# Patient Record
Sex: MALE | Race: ASIAN | NOT HISPANIC OR LATINO | URBAN - METROPOLITAN AREA
[De-identification: names, ages, dates, MRNs, and addresses within clinical notes are randomized per-mention and may not be internally consistent; named-entity substitution may affect disease eponyms.]

---

## 2023-06-13 NOTE — PROGRESS NOTES
Subjective:       Christie Alcazar is a 53 y.o. male who is a new patient who was self-referred  for evaluation of prostate issues.      He reports LUTS. No prior records in [facility] He reports he previously saw Dr Guzmán, last seen 6-9 months ago.     He reports pain in prostate (though on further questioning he states this is testicular pain) and occasional urgency. Some days he notes more frequency. He is on Flomax (takes PRN) and Pyridium PRN. States he was recently given abx by PCP.    Reports prostatitis a few years ago - treated with 3-4 rounds of abx per his report. Had an ultrasound (unsure what kind of US) and cystoscopy in the past with Dr Guzmán. States cysto was normal.     He states he is UTD on PCa screening.      The following portions of the patient's history were reviewed and updated as appropriate: allergies, current medications, past family history, past medical history, past social history, past surgical history and problem list.    Review of Systems  Twelve point review of systems completed. Pertinent positive and negatives listed in HPI.      Objective:    Vitals: Wt 84.6 kg (186 lb 9.9 oz)     Physical Exam   General: well developed, well nourished in no acute distress  Head: normocephalic, atraumatic  Neck: supple, trachea midline, no obvious enlargement of thyroid  HEENT: EOMI, mucus membranes moist, sclera anicteric, no hearing impairment  Lungs: symmetric expansion, non-labored breathing  Skin: no rashes or lesions  Neuro: alert and oriented x 3, no gross deficits  Psych: normal judgment and insight, normal mood/affect and non-anxious  Genitourinary: deferred   GREER: deferred      Lab Review   Urine analysis today in clinic shows Negative     No results found for: WBC, HGB, HCT, MCV, PLT  No results found for: CREATININE, BUN  No results found for: PSA  No results found for: PSADIAG    Imaging  NA       Assessment/Plan:      1. Pain in both testicles    - Intermittent   -  Exacerbated by certain diet triggers   - Flomax daily   - Ibuprofen     2. Urinary urgency    - UA normal   - Prior cystoscopy with Dr Guzmán     3. Other prostatic inflammatory diseases    - Prior h/o prostatitis      4. Enlarged prostate    - On Flomax     5. ED   - Using Viagra PRN    Recommend to get records from Dr Guzmán    Follow up in 6 months with PSA

## 2023-06-19 ENCOUNTER — OFFICE VISIT (OUTPATIENT)
Dept: UROLOGY | Facility: CLINIC | Age: 53
End: 2023-06-19
Payer: COMMERCIAL

## 2023-06-19 ENCOUNTER — TELEPHONE (OUTPATIENT)
Dept: UROLOGY | Facility: CLINIC | Age: 53
End: 2023-06-19

## 2023-06-19 VITALS — WEIGHT: 186.63 LBS

## 2023-06-19 DIAGNOSIS — R39.15 URINARY URGENCY: ICD-10-CM

## 2023-06-19 DIAGNOSIS — N41.8 OTHER PROSTATIC INFLAMMATORY DISEASES: ICD-10-CM

## 2023-06-19 DIAGNOSIS — N50.812 PAIN IN BOTH TESTICLES: Primary | ICD-10-CM

## 2023-06-19 DIAGNOSIS — N40.1 BPH WITH OBSTRUCTION/LOWER URINARY TRACT SYMPTOMS: Primary | ICD-10-CM

## 2023-06-19 DIAGNOSIS — N50.811 PAIN IN BOTH TESTICLES: Primary | ICD-10-CM

## 2023-06-19 DIAGNOSIS — N13.8 BPH WITH OBSTRUCTION/LOWER URINARY TRACT SYMPTOMS: Primary | ICD-10-CM

## 2023-06-19 DIAGNOSIS — N40.0 ENLARGED PROSTATE: ICD-10-CM

## 2023-06-19 PROCEDURE — 99204 OFFICE O/P NEW MOD 45 MIN: CPT | Mod: S$GLB,,, | Performed by: UROLOGY

## 2023-06-19 PROCEDURE — 99204 PR OFFICE/OUTPT VISIT, NEW, LEVL IV, 45-59 MIN: ICD-10-PCS | Mod: S$GLB,,, | Performed by: UROLOGY

## 2023-06-19 PROCEDURE — 99999 PR PBB SHADOW E&M-NEW PATIENT-LVL III: CPT | Mod: PBBFAC,,, | Performed by: UROLOGY

## 2023-06-19 PROCEDURE — 99999 PR PBB SHADOW E&M-NEW PATIENT-LVL III: ICD-10-PCS | Mod: PBBFAC,,, | Performed by: UROLOGY

## 2023-06-19 RX ORDER — PHENAZOPYRIDINE HYDROCHLORIDE 200 MG/1
200 TABLET, FILM COATED ORAL 3 TIMES DAILY
COMMUNITY
Start: 2023-06-08

## 2023-06-19 RX ORDER — TAMSULOSIN HYDROCHLORIDE 0.4 MG/1
2 CAPSULE ORAL
COMMUNITY
Start: 2023-06-08 | End: 2023-12-19 | Stop reason: SDUPTHER

## 2023-06-19 NOTE — PROGRESS NOTES
Pt requests PSA now and not in the 6 months as planned. Requests [facility] orders. I will not be alerted when results return.

## 2023-06-19 NOTE — TELEPHONE ENCOUNTER
Prior to pt leaving clinic, pt requested to completed lab within the next 2 weeks at [facility]. Contacted pt to confirm [facility] location pt is requesting to complete labs. Lab order faxed to [facility] at [address] in [place], [place].

## 2023-09-20 ENCOUNTER — TELEPHONE (OUTPATIENT)
Dept: UROLOGY | Facility: CLINIC | Age: 53
End: 2023-09-20
Payer: COMMERCIAL

## 2023-09-20 NOTE — TELEPHONE ENCOUNTER
----- Message from Virginia Nielson MA sent at 9/20/2023  3:41 PM CDT -----  Regarding: FW: self [phone]  Please send print another order for this pt to go to [facility], he missed his appt. I will fax it over to [facility].  ----- Message -----  From: Maria M Justice  Sent: 9/20/2023   2:20 PM CDT  To: [facility] Staff  Subject: self [phone]                                .Type: Patient Call Back    Who called: self    What is the request in detail: patient is requesting a call a back in regards to sending over another order to [facility] for blood work. Patient stated he missed his appointment.    Can the clinic reply by [facility]? no    Would the patient rather a call back or a response via [facility]? Call back    Best call back number [phone]

## 2023-09-20 NOTE — TELEPHONE ENCOUNTER
Pt was contacted, pt appt scheduled.   ----- Message from Maria M Justice sent at 9/20/2023  2:18 PM CDT -----  Regarding: self [phone]  .Type: Patient Call Back    Who called: self    What is the request in detail: patient is requesting a call a back in regards to sending over another order to [facility] for blood work. Patient stated he missed his appointment.    Can the clinic reply by [facility]? no    Would the patient rather a call back or a response via [facility]? Call back    Best call back number [phone]

## 2023-09-21 ENCOUNTER — TELEPHONE (OUTPATIENT)
Dept: UROLOGY | Facility: CLINIC | Age: 53
End: 2023-09-21
Payer: COMMERCIAL

## 2023-09-21 DIAGNOSIS — N13.8 BPH WITH OBSTRUCTION/LOWER URINARY TRACT SYMPTOMS: Primary | ICD-10-CM

## 2023-09-21 DIAGNOSIS — N40.1 BPH WITH OBSTRUCTION/LOWER URINARY TRACT SYMPTOMS: Primary | ICD-10-CM

## 2023-09-27 NOTE — PROGRESS NOTES
Subjective:       Christie Alcazar is a 53 y.o. male who is an established patient who was self-referred  for evaluation of prostate issues.      He reports LUTS. [facility] prior records [facility] He reports he previously saw Dr Guzmán, last seen 6-9 months ago.     He reports pain in prostate (though on further questioning he states this is testicular pain) and occasional urgency. Some days he notes more frequency. He is on Flomax (takes PRN) and Pyridium PRN. States he was recently given abx by PCP.    Reports prostatitis a few years ago - treated with 3-4 rounds of abx per his report. Had an ultrasound (unsure what kind of US) and cystoscopy in the past with Dr Guzmán. States cysto was normal.     He states he is UTD on PCa screening.    10/2/2023  Reported previous prostate pain. He wanted PSA done sooner, therefore it was ordered. I do not have these results as it was done by PCP (outside [facility]). He states he was given doxycycline previously that was helpful. He took 'leftover' abx recently on and off. Then took Cipro x 3d from PCP. He feels pulling in scrotum. Occasional pain with ejaculation. Denies dysuria. Occasional slow stream. Taking Flomax now BID - increased last week.       The following portions of the patient's history were reviewed and updated as appropriate: allergies, current medications, past family history, past medical history, past social history, past surgical history and problem list.    Review of Systems  Twelve point review of systems completed. Pertinent positive and negatives listed in HPI.      Objective:    Vitals: Wt 85.1 kg (187 lb 9.8 oz)     Physical Exam   General: well developed, well nourished in no acute distress  Head: normocephalic, atraumatic  Neck: supple, trachea midline, no obvious enlargement of thyroid  HEENT: EOMI, mucus membranes moist, sclera anicteric, no hearing impairment  Lungs: symmetric expansion, non-labored breathing  Skin: no rashes or  "lesions  Neuro: alert and oriented x 3, no gross deficits  Psych: normal judgment and insight, normal mood/affect and non-anxious  Genitourinary: Penis - uncircumcised penis without plaques, lesions, or scarring.  Urethra - orthotopic location without stenosis.  Scrotum  - no lesions or rashes, no hydrocele or hernia.  Testes - descended bilaterally, symmetric without masses, non tender.  Epididymides - no cysts or lesions, no spermatocele, symmetric.   GREER: Symmetric 40g prostate without nodularity or tenderness. Normal landmarks. seminal vesicles non palpable, normal sphincter tone without hemorrhoids or rectal masses. Normal appearance of anus and perineum. Prostate not boggy.       Lab Review   Urine analysis today in clinic shows - no urine     No results found for: "WBC", "HGB", "HCT", "MCV", "PLT"  No results found for: "CREATININE", "BUN"  No results found for: "PSA"  No results found for: "PSADIAG"      Imaging  NA       Assessment/Plan:      1. Pain in both testicles    - Intermittent   - Exacerbated by certain diet triggers   - Flomax BID   - Ibuprofen     2. Urinary urgency    - UA normal   - Prior cystoscopy with Dr Guzmán     3. Other prostatic inflammatory diseases    - Prior h/o prostatitis    - Concern with long term antibiotic therapy, hope to avoid     4. Enlarged prostate    - On Flomax     5. ED   - Using Viagra PRN    Recommend to get records from Dr Guzmán. Send records/labs from PCP.       Follow up in 6 months            "

## 2023-10-02 ENCOUNTER — OFFICE VISIT (OUTPATIENT)
Dept: UROLOGY | Facility: CLINIC | Age: 53
End: 2023-10-02
Payer: COMMERCIAL

## 2023-10-02 VITALS — WEIGHT: 187.63 LBS

## 2023-10-02 DIAGNOSIS — N50.812 PAIN IN BOTH TESTICLES: ICD-10-CM

## 2023-10-02 DIAGNOSIS — R39.15 URINARY URGENCY: ICD-10-CM

## 2023-10-02 DIAGNOSIS — N41.8 OTHER PROSTATIC INFLAMMATORY DISEASES: ICD-10-CM

## 2023-10-02 DIAGNOSIS — N40.0 ENLARGED PROSTATE: ICD-10-CM

## 2023-10-02 DIAGNOSIS — N40.1 BPH WITH OBSTRUCTION/LOWER URINARY TRACT SYMPTOMS: Primary | ICD-10-CM

## 2023-10-02 DIAGNOSIS — N13.8 BPH WITH OBSTRUCTION/LOWER URINARY TRACT SYMPTOMS: Primary | ICD-10-CM

## 2023-10-02 DIAGNOSIS — N50.811 PAIN IN BOTH TESTICLES: ICD-10-CM

## 2023-10-02 PROCEDURE — 99999 PR PBB SHADOW E&M-EST. PATIENT-LVL II: ICD-10-PCS | Mod: PBBFAC,,, | Performed by: UROLOGY

## 2023-10-02 PROCEDURE — 99214 OFFICE O/P EST MOD 30 MIN: CPT | Mod: S$GLB,,, | Performed by: UROLOGY

## 2023-10-02 PROCEDURE — 99999 PR PBB SHADOW E&M-EST. PATIENT-LVL II: CPT | Mod: PBBFAC,,, | Performed by: UROLOGY

## 2023-10-02 PROCEDURE — 99214 PR OFFICE/OUTPT VISIT, EST, LEVL IV, 30-39 MIN: ICD-10-PCS | Mod: S$GLB,,, | Performed by: UROLOGY

## 2023-10-27 ENCOUNTER — TELEPHONE (OUTPATIENT)
Dept: UROLOGY | Facility: CLINIC | Age: 53
End: 2023-10-27
Payer: COMMERCIAL

## 2023-10-27 NOTE — TELEPHONE ENCOUNTER
----- Message from Stephany Scott sent at 10/24/2023  1:29 PM CDT -----  .Type: Patient Call Back    Who called: Self     What is the request in detail: Calling about lab results that was sent over from PCP    Can the clinic reply by [facility]? No     Would the patient rather a call back or a response via [facility]? Call Back     Best call back number: .[phone] (home)       Additional Information:

## 2023-12-19 ENCOUNTER — OFFICE VISIT (OUTPATIENT)
Dept: UROLOGY | Facility: CLINIC | Age: 53
End: 2023-12-19
Payer: COMMERCIAL

## 2023-12-19 DIAGNOSIS — R39.15 URINARY URGENCY: ICD-10-CM

## 2023-12-19 DIAGNOSIS — N50.812 PAIN IN BOTH TESTICLES: ICD-10-CM

## 2023-12-19 DIAGNOSIS — N40.1 BPH WITH OBSTRUCTION/LOWER URINARY TRACT SYMPTOMS: Primary | ICD-10-CM

## 2023-12-19 DIAGNOSIS — N41.8 OTHER PROSTATIC INFLAMMATORY DISEASES: ICD-10-CM

## 2023-12-19 DIAGNOSIS — N13.8 BPH WITH OBSTRUCTION/LOWER URINARY TRACT SYMPTOMS: Primary | ICD-10-CM

## 2023-12-19 DIAGNOSIS — N50.811 PAIN IN BOTH TESTICLES: ICD-10-CM

## 2023-12-19 DIAGNOSIS — N40.0 ENLARGED PROSTATE: ICD-10-CM

## 2023-12-19 PROCEDURE — 99214 PR OFFICE/OUTPT VISIT, EST, LEVL IV, 30-39 MIN: ICD-10-PCS | Mod: S$GLB,,, | Performed by: UROLOGY

## 2023-12-19 PROCEDURE — 99999 PR PBB SHADOW E&M-EST. PATIENT-LVL II: CPT | Mod: PBBFAC,,, | Performed by: UROLOGY

## 2023-12-19 PROCEDURE — 99214 OFFICE O/P EST MOD 30 MIN: CPT | Mod: S$GLB,,, | Performed by: UROLOGY

## 2023-12-19 PROCEDURE — 99999 PR PBB SHADOW E&M-EST. PATIENT-LVL II: ICD-10-PCS | Mod: PBBFAC,,, | Performed by: UROLOGY

## 2023-12-19 RX ORDER — TAMSULOSIN HYDROCHLORIDE 0.4 MG/1
0.4 CAPSULE ORAL DAILY
Qty: 90 CAPSULE | Refills: 3 | Status: SHIPPED | OUTPATIENT
Start: 2023-12-19

## 2023-12-22 ENCOUNTER — HOSPITAL ENCOUNTER (EMERGENCY)
Facility: HOSPITAL | Age: 53
Discharge: HOME | End: 2023-12-22
Attending: EMERGENCY MEDICINE
Payer: COMMERCIAL

## 2023-12-22 VITALS
BODY MASS INDEX: 27.4 KG/M2 | HEART RATE: 66 BPM | HEIGHT: 69 IN | WEIGHT: 185 LBS | OXYGEN SATURATION: 98 % | TEMPERATURE: 98 F | RESPIRATION RATE: 18 BRPM | SYSTOLIC BLOOD PRESSURE: 131 MMHG | DIASTOLIC BLOOD PRESSURE: 78 MMHG

## 2023-12-22 DIAGNOSIS — S39.012A STRAIN OF LUMBAR REGION, INITIAL ENCOUNTER: Primary | ICD-10-CM

## 2023-12-22 LAB
ALBUMIN SERPL BCP-MCNC: 4.2 G/DL (ref 3.5–5.2)
ALLENS TEST: ABNORMAL
ALP SERPL-CCNC: 59 U/L (ref 55–135)
ALT SERPL W/O P-5'-P-CCNC: 61 U/L (ref 10–44)
ANION GAP SERPL CALC-SCNC: 10 MMOL/L (ref 8–16)
ANION GAP SERPL CALC-SCNC: 15 MMOL/L (ref 8–16)
AST SERPL-CCNC: 35 U/L (ref 10–40)
BASOPHILS # BLD AUTO: 0.04 K/UL (ref 0–0.2)
BASOPHILS NFR BLD: 0.6 % (ref 0–1.9)
BILIRUB SERPL-MCNC: 0.6 MG/DL (ref 0.1–1)
BILIRUB UR QL STRIP: NEGATIVE
BUN SERPL-MCNC: 18 MG/DL (ref 6–20)
BUN SERPL-MCNC: 21 MG/DL (ref 6–30)
CALCIUM SERPL-MCNC: 9.3 MG/DL (ref 8.7–10.5)
CHLORIDE SERPL-SCNC: 103 MMOL/L (ref 95–110)
CHLORIDE SERPL-SCNC: 105 MMOL/L (ref 95–110)
CLARITY UR: CLEAR
CO2 SERPL-SCNC: 25 MMOL/L (ref 23–29)
COLOR UR: YELLOW
CREAT SERPL-MCNC: 0.8 MG/DL (ref 0.5–1.4)
CREAT SERPL-MCNC: 0.9 MG/DL (ref 0.5–1.4)
DIFFERENTIAL METHOD: NORMAL
EOSINOPHIL # BLD AUTO: 0.2 K/UL (ref 0–0.5)
EOSINOPHIL NFR BLD: 2.3 % (ref 0–8)
ERYTHROCYTE [DISTWIDTH] IN BLOOD BY AUTOMATED COUNT: 12.6 % (ref 11.5–14.5)
EST. GFR  (NO RACE VARIABLE): >60 ML/MIN/1.73 M^2
GLUCOSE SERPL-MCNC: 145 MG/DL (ref 70–110)
GLUCOSE SERPL-MCNC: 147 MG/DL (ref 70–110)
GLUCOSE UR QL STRIP: NEGATIVE
HCT VFR BLD AUTO: 46.9 % (ref 40–54)
HCT VFR BLD CALC: 46 %PCV (ref 36–54)
HGB BLD-MCNC: 16.1 G/DL (ref 14–18)
HGB UR QL STRIP: NEGATIVE
IMM GRANULOCYTES # BLD AUTO: 0.02 K/UL (ref 0–0.04)
IMM GRANULOCYTES NFR BLD AUTO: 0.3 % (ref 0–0.5)
KETONES UR QL STRIP: NEGATIVE
LEUKOCYTE ESTERASE UR QL STRIP: NEGATIVE
LIPASE SERPL-CCNC: 18 U/L (ref 4–60)
LYMPHOCYTES # BLD AUTO: 1.7 K/UL (ref 1–4.8)
LYMPHOCYTES NFR BLD: 25.1 % (ref 18–48)
MCH RBC QN AUTO: 30.9 PG (ref 27–31)
MCHC RBC AUTO-ENTMCNC: 34.3 G/DL (ref 32–36)
MCV RBC AUTO: 90 FL (ref 82–98)
MONOCYTES # BLD AUTO: 0.6 K/UL (ref 0.3–1)
MONOCYTES NFR BLD: 8.1 % (ref 4–15)
NEUTROPHILS # BLD AUTO: 4.4 K/UL (ref 1.8–7.7)
NEUTROPHILS NFR BLD: 63.6 % (ref 38–73)
NITRITE UR QL STRIP: NEGATIVE
NRBC BLD-RTO: 0 /100 WBC
PH UR STRIP: 6 [PH] (ref 5–8)
PLATELET # BLD AUTO: 246 K/UL (ref 150–450)
PMV BLD AUTO: 9.2 FL (ref 9.2–12.9)
POC IONIZED CALCIUM: 1.25 MMOL/L (ref 1.06–1.42)
POC TCO2 (MEASURED): 27 MMOL/L (ref 23–29)
POTASSIUM BLD-SCNC: 4 MMOL/L (ref 3.5–5.1)
POTASSIUM SERPL-SCNC: 4 MMOL/L (ref 3.5–5.1)
PROT SERPL-MCNC: 7.9 G/DL (ref 6–8.4)
PROT UR QL STRIP: NEGATIVE
RBC # BLD AUTO: 5.21 M/UL (ref 4.6–6.2)
SAMPLE: ABNORMAL
SITE: ABNORMAL
SODIUM BLD-SCNC: 140 MMOL/L (ref 136–145)
SODIUM SERPL-SCNC: 140 MMOL/L (ref 136–145)
SP GR UR STRIP: >1.03 (ref 1–1.03)
URN SPEC COLLECT METH UR: ABNORMAL
UROBILINOGEN UR STRIP-ACNC: NEGATIVE EU/DL
WBC # BLD AUTO: 6.92 K/UL (ref 3.9–12.7)

## 2023-12-22 PROCEDURE — 82565 ASSAY OF CREATININE: CPT

## 2023-12-22 PROCEDURE — 80053 COMPREHEN METABOLIC PANEL: CPT | Performed by: NURSE PRACTITIONER

## 2023-12-22 PROCEDURE — 82330 ASSAY OF CALCIUM: CPT

## 2023-12-22 PROCEDURE — 83690 ASSAY OF LIPASE: CPT | Performed by: NURSE PRACTITIONER

## 2023-12-22 PROCEDURE — 84295 ASSAY OF SERUM SODIUM: CPT

## 2023-12-22 PROCEDURE — 99285 EMERGENCY DEPT VISIT HI MDM: CPT | Mod: 25

## 2023-12-22 PROCEDURE — 63600175 PHARM REV CODE 636 W HCPCS

## 2023-12-22 PROCEDURE — 99900035 HC TECH TIME PER 15 MIN (STAT)

## 2023-12-22 PROCEDURE — 81003 URINALYSIS AUTO W/O SCOPE: CPT | Performed by: NURSE PRACTITIONER

## 2023-12-22 PROCEDURE — 85014 HEMATOCRIT: CPT

## 2023-12-22 PROCEDURE — 25500020 PHARM REV CODE 255: Performed by: EMERGENCY MEDICINE

## 2023-12-22 PROCEDURE — 96360 HYDRATION IV INFUSION INIT: CPT

## 2023-12-22 PROCEDURE — 84132 ASSAY OF SERUM POTASSIUM: CPT

## 2023-12-22 PROCEDURE — 80048 BASIC METABOLIC PNL TOTAL CA: CPT | Mod: XB

## 2023-12-22 PROCEDURE — 85025 COMPLETE CBC W/AUTO DIFF WBC: CPT | Performed by: NURSE PRACTITIONER

## 2023-12-22 RX ORDER — METHOCARBAMOL 500 MG/1
500 TABLET, FILM COATED ORAL 4 TIMES DAILY
Qty: 40 TABLET | Refills: 0 | Status: SHIPPED | OUTPATIENT
Start: 2023-12-22 | End: 2024-01-01

## 2023-12-22 RX ORDER — MELOXICAM 7.5 MG/1
7.5 TABLET ORAL DAILY
Qty: 12 TABLET | Refills: 0 | Status: SHIPPED | OUTPATIENT
Start: 2023-12-22

## 2023-12-22 RX ADMIN — IOHEXOL 75 ML: 350 INJECTION, SOLUTION INTRAVENOUS at 02:12

## 2023-12-22 RX ADMIN — SODIUM CHLORIDE, POTASSIUM CHLORIDE, SODIUM LACTATE AND CALCIUM CHLORIDE 1000 ML: 600; 310; 30; 20 INJECTION, SOLUTION INTRAVENOUS at 01:12

## 2023-12-22 NOTE — DISCHARGE INSTRUCTIONS
You had a normal workup here in the ED and CT scan was normal showing no signs of appendicitis. We will treat you for a muscle strain.  Please take meloxicam and Robaxin as prescribed.  Follow up with primary care.  Thank you for coming to our Emergency Department today. It is important to remember that some problems are difficult to diagnose and may not be found during your Emergency Department visit. Be sure to follow up with your primary care doctor and review all labs/imaging/tests that were performed during this visit with them. Some labs/tests may be outside of the normal range and require non-emergent follow-up and further investigation to help diagnose/exclude/prevent complications or other medical conditions.    If you do not have a primary care doctor, you may contact the one listed on your discharge paperwork or you may also call the [facility] Appointment Desk at [phone] to schedule an appointment and establish care with one. It is important to your health that you have a primary care doctor.    Please take all medications as directed. All medications may potentially have side-effects and it is impossible to predict which medications may give you side-effects or what side-effects (if any) they will give you.. If you feel that you are having a negative effect or side-effect of any medication you should immediately stop taking them and seek medical attention. If you feel that you are having a life-threatening reaction call 151.    Return to the ER with any questions/concerns, new/concerning symptoms, worsening or failure to improve.     Do not drive, swim, climb to height, take a bath or make any important decisions for 24 hours if you have received any pain medications, sedatives or mood altering drugs during your ER visit.

## 2023-12-22 NOTE — ED PROVIDER NOTES
Encounter Date: 12/22/2023    SCRIBE #1 NOTE: I, Meche Gilliland, am scribing for, and in the presence of,  Cezar Lopez PA-C. I have scribed the following portions of the note - Other sections scribed: HPI, ROS, PE.       History     Chief Complaint   Patient presents with    Diarrhea    Back Pain     Pt presents to ER with complaints of Diarrhea and right side back pain times 2 weeks. Pt states his MD told  him he may have appendicitis. Pt denies any other issues at this time.      CC: abdominal pain    HPI: This is a 53 y.o.male patient, with no pertinent PMHx, presenting to the ED for further evaluation of RLQ abdominal pain radiating to the back beginning a month ago. Patient reports pain worsens with movement. Patient reports associated symptoms of intermittent diarrhea for the past 2 weeks since recent colonoscopy ( done 2 weeks ago). Patient reports he has 3-4 episodes of diarrhea daily. Patient states he applied a lidocaine patch to his back without any relief. Patient reports his PCP recommended he get a scan to rule out appendicitis. Patient reports last bowel movement was this morning. Patient denies any blood in stool. Patient denies any fever or chills. Patient denies any nausea or vomiting. Patient denies any hematuria or dysuria. No prior Tx. No alleviating factors. No known drug allergies.        The history is provided by the patient. No  was used.     Review of patient's allergies indicates:  No Known Allergies  History reviewed. No pertinent past medical history.  History reviewed. No pertinent surgical history.  History reviewed. No pertinent family history.  Social History     Tobacco Use    Smoking status: Never    Smokeless tobacco: Never   Substance Use Topics    Alcohol use: Never    Drug use: Never     Review of Systems   Constitutional:  Negative for chills and fever.   Respiratory:  Negative for shortness of breath.    Cardiovascular:  Negative for chest pain.    Gastrointestinal:  Positive for abdominal pain (RLQ radiating to back) and diarrhea. Negative for nausea and vomiting.   Genitourinary:  Negative for dysuria, frequency, hematuria, penile discharge and testicular pain.   Musculoskeletal:  Positive for back pain. Negative for neck pain and neck stiffness.   Neurological:  Negative for headaches.       Physical Exam     Initial Vitals [12/22/23 1310]   BP Pulse Resp Temp SpO2   121/76 65 16 98 °F (36.7 °C) 97 %      MAP       --         Physical Exam    Nursing note and vitals reviewed.  Constitutional: He appears well-developed and well-nourished.   HENT:   Head: Normocephalic and atraumatic.   Right Ear: External ear normal.   Left Ear: External ear normal.   Neck: Carotid bruit is not present.   Normal range of motion.  Cardiovascular:  Normal rate, regular rhythm, normal heart sounds and intact distal pulses.     Exam reveals no gallop and no friction rub.       No murmur heard.  Pulmonary/Chest: Breath sounds normal. No respiratory distress. He has no wheezes. He has no rhonchi. He has no rales.   Abdominal: Abdomen is soft. Bowel sounds are normal. He exhibits no distension. There is no abdominal tenderness. There is no rebound and no guarding.   Musculoskeletal:         General: Normal range of motion.      Cervical back: Normal range of motion.      Comments: No lumbar spine musculature tenderness     Neurological: He is alert and oriented to person, place, and time. GCS score is 15.   Psychiatric: He has a normal mood and affect.         ED Course   Procedures  Labs Reviewed   COMPREHENSIVE METABOLIC PANEL - Abnormal; Notable for the following components:       Result Value    Glucose 147 (*)     ALT 61 (*)     All other components within normal limits   URINALYSIS, REFLEX TO URINE CULTURE - Abnormal; Notable for the following components:    Specific Gravity, UA >1.030 (*)     All other components within normal limits    Narrative:     Specimen  Source->Urine   ISTAT PROCEDURE - Abnormal; Notable for the following components:    POC Glucose 145 (*)     All other components within normal limits   CBC W/ AUTO DIFFERENTIAL   LIPASE   ISTAT CHEM8          Imaging Results              CT Abdomen Pelvis With IV Contrast NO Oral Contrast (Final result)  Result time 12/22/23 15:06:51      Final result by Emiliano Ngo MD (12/22/23 15:06:51)                   Impression:      No acute process or CT findings identified to explain patient's symptoms of right lower quadrant pain.  Specifically, appendix and terminal ileum are within normal limits.    Few additional findings as above.      Electronically signed by: Emiliano Ngo MD  Date:    12/22/2023  Time:    15:06               Narrative:    EXAMINATION:  CT ABDOMEN PELVIS WITH IV CONTRAST    CLINICAL HISTORY:  RLQ abdominal pain (Age >= 14y);    TECHNIQUE:  Low dose axial images, sagittal and coronal reformations were obtained from the lung bases to the pubic symphysis following the IV administration of 85 mL of Omnipaque 350 .  Oral contrast was not given.    COMPARISON:  None.    FINDINGS:  Imaged lung bases are clear.  Base of the heart is within normal limits.    Portal vasculature appears patent.  Liver, gallbladder, pancreas, spleen, stomach, duodenum and bilateral adrenal glands are within normal limits.  No biliary ductal dilatation.    Bilateral kidneys are normal in size, shape and location with symmetric normal enhancement.  No hydronephrosis or significant perinephric stranding.  Ureters are normal in course and caliber.  Urinary bladder is suboptimally distended.  Prostate and seminal vesicles are within normal limits.    Appendix and terminal ileum are within normal limits.  No evidence of bowel obstruction or acute inflammation.  No pneumatosis or portal venous gas.  No right lower quadrant inflammatory process.    No ascites, free air or lymphadenopathy by CT criteria.  Mild scattered calcific  atherosclerosis of the distal abdominal aorta extending into its iliac branches.  No aortic aneurysm or dissection.    Extraperitoneal soft tissues are without significant abnormality.  Osseous structures show age-related degenerative change most prominent at L4-5 level, without acute or destructive process seen.                                       Medications   lactated ringers bolus 1,000 mL (0 mLs Intravenous Stopped 12/22/23 1446)   iohexoL (OMNIPAQUE 350) injection 75 mL (75 mLs Intravenous Given 12/22/23 1405)     Medical Decision Making  This is an emergent evaluation of a 53-year-old male who presents to the emergency department for intermittent sharp right lower quadrant abdominal pain that radiates to his back x 1 month.  Reports he was sent from primary care for CT scan to rule out appendicitis.  Physical exam unremarkable. Regular rate rhythm without murmurs.  No carotid bruits appreciated on exam. Lungs are clear to auscultation bilaterally.  Abdomen is soft, nontender, non distended, with normal bowel sounds.  Differential diagnosis includes but is not limited to muscle strain, appendicitis, cholecystitis, GERD/gastritis, bowel obstruction.  I doubt an acute abdomen given normal physical exam findings and normal vital signs.  Workup initiated with basic labs, lipase, urinalysis, CT abdomen and pelvis with IV contrast.  Ordered 1 L of lactated Ringer's.  CBC is without leukocytosis or anemia.  CMP with normal renal function, no electrolyte abnormalities, ALT of 61.  Lipase within normal limits, no evidence for acute pancreatitis.  CT showing no acute process or findings identified to explain right lower quadrant abdominal pain.  Appendix appears normal.  Serial abdominal exam benign.  Given low back pain that is worse with any type of movement, will treat for muscle strain.  Will send home with meloxicam, Robaxin. Patient is very well appearing, and in no acute distress. Vital signs are reassuring  here in the emergency department, patient is afebrile, breathing comfortable, satting 97 % on room air. Patient/Caregiver is stable for discharge at this time. Patient/Caregiver verbalize understanding of care plan. All questions and concerns were addressed. Discussed strict return precautions with the patient/caregiver. Instructed follow up with primary care provider within 1 week.      Cezar Lopez PA-C    DISCLAIMER: This note was prepared with [employer] voice recognition transcription software. Garbled syntax, mangled pronouns, and other bizarre constructions may be attributed to that software system.     Amount and/or Complexity of Data Reviewed  Labs: ordered.  Radiology: ordered.    Risk  Prescription drug management.            Scribe Attestation:   Scribe #1: I performed the above scribed service and the documentation accurately describes the services I performed. I attest to the accuracy of the note.                               Clinical Impression:  Final diagnoses:  [S39.012A] Strain of lumbar region, initial encounter (Primary)          ED Disposition Condition    Discharge Stable          ED Prescriptions       Medication Sig Dispense Start Date End Date Auth. Provider    meloxicam (MOBIC) 7.5 MG tablet Take 1 tablet (7.5 mg total) by mouth once daily. 12 tablet 12/22/2023 -- Cezar Lopez PA-C    methocarbamoL (ROBAXIN) 500 MG Tab Take 1 tablet (500 mg total) by mouth 4 (four) times daily. for 10 days 40 tablet 12/22/2023 1/1/2024 Cezar Lopez PA-C          Follow-up Information       Follow up With Specialties Details Why Contact Info    [facility] - Emergency Dept Emergency Medicine Go to  As needed, If symptoms worsen, or new symptoms develop [address] [facility] [place] [address] [phone]          ICezar PA-C, personally performed the services described in this documentation. All medical record entries made by the scribe  were at my direction and in my presence. I have reviewed the chart and agree that the record reflects my personal performance and is accurate and complete.       Cezar Lopez, PA-C  12/22/23 1544

## 2025-07-02 NOTE — PROGRESS NOTES
Medication sent to Dr. Wyman for approval   Subjective:       Christie Alcazar is a 53 y.o. male who is an established patient who was self-referred  for evaluation of prostate issues.      He reports LUTS. No prior records in [facility]/referrals/Care[facility]. He reports he previously saw Dr Guzmán, last seen 6-9 months ago.     He reports pain in prostate (though on further questioning he states this is testicular pain) and occasional urgency. Some days he notes more frequency. He is on Flomax (takes PRN) and Pyridium PRN. States he was recently given abx by PCP.    Reports prostatitis a few years ago - treated with 3-4 rounds of abx per his report. Had an ultrasound (unsure what kind of US) and cystoscopy in the past with Dr Guzmán. States cysto was normal.     He states he is UTD on PCa screening.    PSA 9/23 - 0.18 ([facility])    10/2/2023  Reported previous prostate pain. He wanted PSA done sooner, therefore it was ordered. I do not have these results as it was done by PCP (outside [facility]). He states he was given doxycycline previously that was helpful. He took 'leftover' abx recently on and off. Then took Cipro x 3d from PCP. He feels pulling in scrotum. Occasional pain with ejaculation. Denies dysuria. Occasional slow stream. Taking Flomax now BID - increased last week.     12/19/2023  Symptoms improved after last abx given by PCP. Notes frequency. Colonoscopy 2 weeks ago - clear though notes some GI distress since then.       The following portions of the patient's history were reviewed and updated as appropriate: allergies, current medications, past family history, past medical history, past social history, past surgical history and problem list.    Review of Systems  Twelve point review of systems completed. Pertinent positive and negatives listed in HPI.      Objective:    Vitals: There were no vitals taken for this visit.    Physical Exam   General: well developed, well nourished in no acute distress  Head: normocephalic, atraumatic  Neck: supple, trachea  "midline, no obvious enlargement of thyroid  HEENT: EOMI, mucus membranes moist, sclera anicteric, no hearing impairment  Lungs: symmetric expansion, non-labored breathing  Skin: no rashes or lesions  Neuro: alert and oriented x 3, no gross deficits  Psych: normal judgment and insight, normal mood/affect and non-anxious  Genitourinary: (last visit) Penis - uncircumcised penis without plaques, lesions, or scarring.  Urethra - orthotopic location without stenosis.  Scrotum  - no lesions or rashes, no hydrocele or hernia.  Testes - descended bilaterally, symmetric without masses, non tender.  Epididymides - no cysts or lesions, no spermatocele, symmetric.   GREER: Symmetric 40g prostate without nodularity or tenderness. Normal landmarks. seminal vesicles non palpable, normal sphincter tone without hemorrhoids or rectal masses. Normal appearance of anus and perineum. Prostate not boggy.       Lab Review   Urine analysis today in clinic shows - no urine     No results found for: "WBC", "HGB", "HCT", "MCV", "PLT"  No results found for: "CREATININE", "BUN"  No results found for: "PSA"  No results found for: "PSADIAG"      Imaging  NA       Assessment/Plan:      1. Pain in both testicles    - Intermittent   - Exacerbated by certain diet triggers   - Flomax BID, now only daily. Refilled.    - Ibuprofen PRN     2. Urinary urgency    - UA normal   - Prior cystoscopy with Dr Guzmán     3. Other prostatic inflammatory diseases    - Prior h/o prostatitis    - Concern with long term antibiotic therapy, hope to avoid     4. Enlarged prostate    - On Flomax     5. ED   - Using Viagra PRN    Recommend to get records from Dr Guzmán.       Follow up in 6 months            "